# Patient Record
Sex: MALE | Race: WHITE | NOT HISPANIC OR LATINO | Employment: OTHER | URBAN - METROPOLITAN AREA
[De-identification: names, ages, dates, MRNs, and addresses within clinical notes are randomized per-mention and may not be internally consistent; named-entity substitution may affect disease eponyms.]

---

## 2024-07-17 ENCOUNTER — ANESTHESIA EVENT (OUTPATIENT)
Dept: SURGERY | Facility: AMBULATORY SURGERY CENTER | Age: 69
End: 2024-07-17

## 2024-07-18 ENCOUNTER — HOSPITAL ENCOUNTER (OUTPATIENT)
Facility: AMBULATORY SURGERY CENTER | Age: 69
Discharge: HOME | End: 2024-07-18
Attending: COLON & RECTAL SURGERY

## 2024-07-18 ENCOUNTER — ANESTHESIA (OUTPATIENT)
Dept: SURGERY | Facility: AMBULATORY SURGERY CENTER | Age: 69
End: 2024-07-18

## 2024-07-18 VITALS
OXYGEN SATURATION: 96 % | DIASTOLIC BLOOD PRESSURE: 61 MMHG | HEART RATE: 67 BPM | BODY MASS INDEX: 24.64 KG/M2 | RESPIRATION RATE: 16 BRPM | WEIGHT: 176 LBS | TEMPERATURE: 96.8 F | SYSTOLIC BLOOD PRESSURE: 118 MMHG | HEIGHT: 71 IN

## 2024-07-18 DIAGNOSIS — K62.9 ANAL LESION: ICD-10-CM

## 2024-07-18 RX ORDER — NALOXONE HYDROCHLORIDE 0.4 MG/ML
0.1 INJECTION, SOLUTION INTRAMUSCULAR; INTRAVENOUS; SUBCUTANEOUS
Status: DISCONTINUED | OUTPATIENT
Start: 2024-07-18 | End: 2024-07-19 | Stop reason: HOSPADM

## 2024-07-18 RX ORDER — BUPIVACAINE HYDROCHLORIDE 2.5 MG/ML
INJECTION, SOLUTION INFILTRATION; PERINEURAL PRN
Status: DISCONTINUED | OUTPATIENT
Start: 2024-07-18 | End: 2024-07-18 | Stop reason: HOSPADM

## 2024-07-18 RX ORDER — DEXAMETHASONE SODIUM PHOSPHATE 4 MG/ML
INJECTION, SOLUTION INTRA-ARTICULAR; INTRALESIONAL; INTRAMUSCULAR; INTRAVENOUS; SOFT TISSUE PRN
Status: DISCONTINUED | OUTPATIENT
Start: 2024-07-18 | End: 2024-07-18

## 2024-07-18 RX ORDER — ACETAMINOPHEN 325 MG/1
650 TABLET ORAL
Status: DISCONTINUED | OUTPATIENT
Start: 2024-07-18 | End: 2024-07-19 | Stop reason: HOSPADM

## 2024-07-18 RX ORDER — PROPOFOL 10 MG/ML
INJECTION, EMULSION INTRAVENOUS PRN
Status: DISCONTINUED | OUTPATIENT
Start: 2024-07-18 | End: 2024-07-18

## 2024-07-18 RX ORDER — ONDANSETRON 2 MG/ML
INJECTION INTRAMUSCULAR; INTRAVENOUS PRN
Status: DISCONTINUED | OUTPATIENT
Start: 2024-07-18 | End: 2024-07-18

## 2024-07-18 RX ORDER — DEXAMETHASONE SODIUM PHOSPHATE 4 MG/ML
4 INJECTION, SOLUTION INTRA-ARTICULAR; INTRALESIONAL; INTRAMUSCULAR; INTRAVENOUS; SOFT TISSUE
Status: DISCONTINUED | OUTPATIENT
Start: 2024-07-18 | End: 2024-07-19 | Stop reason: HOSPADM

## 2024-07-18 RX ORDER — FENTANYL CITRATE 50 UG/ML
INJECTION, SOLUTION INTRAMUSCULAR; INTRAVENOUS PRN
Status: DISCONTINUED | OUTPATIENT
Start: 2024-07-18 | End: 2024-07-18

## 2024-07-18 RX ORDER — ACETAMINOPHEN 325 MG/1
650 TABLET ORAL EVERY 4 HOURS PRN
COMMUNITY
Start: 2024-07-18 | End: 2024-08-01

## 2024-07-18 RX ORDER — IBUPROFEN 800 MG/1
800 TABLET, FILM COATED ORAL EVERY 8 HOURS PRN
COMMUNITY
Start: 2024-07-18 | End: 2024-08-01

## 2024-07-18 RX ORDER — ONDANSETRON 4 MG/1
4 TABLET, ORALLY DISINTEGRATING ORAL
Status: DISCONTINUED | OUTPATIENT
Start: 2024-07-18 | End: 2024-07-19 | Stop reason: HOSPADM

## 2024-07-18 RX ORDER — OXYCODONE HYDROCHLORIDE 5 MG/1
5 TABLET ORAL
Status: DISCONTINUED | OUTPATIENT
Start: 2024-07-18 | End: 2024-07-19 | Stop reason: HOSPADM

## 2024-07-18 RX ORDER — SODIUM CHLORIDE, SODIUM LACTATE, POTASSIUM CHLORIDE, CALCIUM CHLORIDE 600; 310; 30; 20 MG/100ML; MG/100ML; MG/100ML; MG/100ML
INJECTION, SOLUTION INTRAVENOUS CONTINUOUS
Status: DISCONTINUED | OUTPATIENT
Start: 2024-07-18 | End: 2024-07-19 | Stop reason: HOSPADM

## 2024-07-18 RX ORDER — LIDOCAINE HYDROCHLORIDE 20 MG/ML
INJECTION, SOLUTION INFILTRATION; PERINEURAL PRN
Status: DISCONTINUED | OUTPATIENT
Start: 2024-07-18 | End: 2024-07-18

## 2024-07-18 RX ORDER — POLYETHYLENE GLYCOL 3350 17 G/17G
1 POWDER, FOR SOLUTION ORAL DAILY PRN
COMMUNITY
Start: 2024-07-18

## 2024-07-18 RX ORDER — LIDOCAINE 40 MG/G
CREAM TOPICAL
Status: DISCONTINUED | OUTPATIENT
Start: 2024-07-18 | End: 2024-07-19 | Stop reason: HOSPADM

## 2024-07-18 RX ORDER — EPHEDRINE SULFATE 50 MG/ML
INJECTION, SOLUTION INTRAMUSCULAR; INTRAVENOUS; SUBCUTANEOUS PRN
Status: DISCONTINUED | OUTPATIENT
Start: 2024-07-18 | End: 2024-07-18

## 2024-07-18 RX ORDER — ONDANSETRON 2 MG/ML
4 INJECTION INTRAMUSCULAR; INTRAVENOUS EVERY 30 MIN PRN
Status: DISCONTINUED | OUTPATIENT
Start: 2024-07-18 | End: 2024-07-19 | Stop reason: HOSPADM

## 2024-07-18 RX ORDER — ONDANSETRON 4 MG/1
4 TABLET, ORALLY DISINTEGRATING ORAL EVERY 30 MIN PRN
Status: DISCONTINUED | OUTPATIENT
Start: 2024-07-18 | End: 2024-07-19 | Stop reason: HOSPADM

## 2024-07-18 RX ORDER — ACETAMINOPHEN 325 MG/1
975 TABLET ORAL ONCE
Status: COMPLETED | OUTPATIENT
Start: 2024-07-18 | End: 2024-07-18

## 2024-07-18 RX ORDER — OXYCODONE HYDROCHLORIDE 10 MG/1
10 TABLET ORAL
Status: DISCONTINUED | OUTPATIENT
Start: 2024-07-18 | End: 2024-07-19 | Stop reason: HOSPADM

## 2024-07-18 RX ADMIN — ACETAMINOPHEN 975 MG: 325 TABLET ORAL at 09:47

## 2024-07-18 RX ADMIN — EPHEDRINE SULFATE 10 MG: 50 INJECTION, SOLUTION INTRAMUSCULAR; INTRAVENOUS; SUBCUTANEOUS at 11:00

## 2024-07-18 RX ADMIN — LIDOCAINE HYDROCHLORIDE 5 ML: 20 INJECTION, SOLUTION INFILTRATION; PERINEURAL at 10:42

## 2024-07-18 RX ADMIN — EPHEDRINE SULFATE 5 MG: 50 INJECTION, SOLUTION INTRAMUSCULAR; INTRAVENOUS; SUBCUTANEOUS at 11:02

## 2024-07-18 RX ADMIN — FENTANYL CITRATE 50 MCG: 50 INJECTION, SOLUTION INTRAMUSCULAR; INTRAVENOUS at 10:52

## 2024-07-18 RX ADMIN — ONDANSETRON 4 MG: 2 INJECTION INTRAMUSCULAR; INTRAVENOUS at 10:42

## 2024-07-18 RX ADMIN — DEXAMETHASONE SODIUM PHOSPHATE 8 MG: 4 INJECTION, SOLUTION INTRA-ARTICULAR; INTRALESIONAL; INTRAMUSCULAR; INTRAVENOUS; SOFT TISSUE at 10:42

## 2024-07-18 RX ADMIN — PROPOFOL 150 MG: 10 INJECTION, EMULSION INTRAVENOUS at 10:42

## 2024-07-18 RX ADMIN — SODIUM CHLORIDE, SODIUM LACTATE, POTASSIUM CHLORIDE, CALCIUM CHLORIDE: 600; 310; 30; 20 INJECTION, SOLUTION INTRAVENOUS at 11:12

## 2024-07-18 RX ADMIN — SODIUM CHLORIDE, SODIUM LACTATE, POTASSIUM CHLORIDE, CALCIUM CHLORIDE: 600; 310; 30; 20 INJECTION, SOLUTION INTRAVENOUS at 11:18

## 2024-07-18 RX ADMIN — SODIUM CHLORIDE, SODIUM LACTATE, POTASSIUM CHLORIDE, CALCIUM CHLORIDE: 600; 310; 30; 20 INJECTION, SOLUTION INTRAVENOUS at 09:38

## 2024-07-18 RX ADMIN — Medication 50 MG: at 10:42

## 2024-07-18 RX ADMIN — FENTANYL CITRATE 50 MCG: 50 INJECTION, SOLUTION INTRAMUSCULAR; INTRAVENOUS at 10:42

## 2024-07-18 ASSESSMENT — COPD QUESTIONNAIRES: COPD: 0

## 2024-07-18 ASSESSMENT — LIFESTYLE VARIABLES: TOBACCO_USE: 1

## 2024-07-18 ASSESSMENT — ENCOUNTER SYMPTOMS
DYSRHYTHMIAS: 0
SEIZURES: 0

## 2024-07-18 NOTE — DISCHARGE INSTRUCTIONS
If you have any questions or concerns regarding your procedure, please contact Dr. Moya, his office number is [phone].     You have received 975 mg of Acetaminophen (Tylenol) at 0950. Please do not take an additional dose of Tylenol until after 3:50PM     Do not exceed 4,000 mg of acetaminophen during a 24 hour period and keep in mind that acetaminophen can also be found in many over-the-counter cold medications as well as narcotics that may be given for pain.      [facility]   Adult Discharge Orders & Instructions     For 24 hours after surgery    Get plenty of rest.  A responsible adult must stay with you for at least 24 hours after you leave the hospital.   Do not drive or use heavy equipment.  If you have weakness or tingling, don't drive or use heavy equipment until this feeling goes away.  Do not drink alcohol.  Avoid strenuous or risky activities.  Ask for help when climbing stairs.   You may feel lightheaded.  IF so, sit for a few minutes before standing.  Have someone help you get up.   If you have nausea (feel sick to your stomach): Drink only clear liquids such as apple juice, ginger ale, broth or 7-Up.  Rest may also help.  Be sure to drink enough fluids.  Move to a regular diet as you feel able.  You may have a slight fever. Call the doctor if your fever is over 100 F (37.7 C) (taken under the tongue) or lasts longer than 24 hours.  You may have a dry mouth, a sore throat, muscle aches or trouble sleeping.  These should go away after 24 hours.  Do not make important or legal decisions.   Call your doctor for any of the followin.  Signs of infection (fever, growing tenderness at the surgery site, a large amount of drainage or bleeding, severe pain, foul-smelling drainage, redness, swelling).    2. It has been over 8 to 10 hours since surgery and you are still not able to urinate (pass water).    3.  Headache for over 24 hours.     Patient Discharge Instructions:    You have  just undergone anorectal surgery.  Here are a few things to expect after your surgery:    1) It is common to have pain after surgery.  We try to focus on non-narcotic medications when possible, although sometimes we do give a narcotic prescription for pain that cannot be controlled with Tylenol and Ibuprofen. Take the medications as follows:  Tylenol: 650mg every 4 hours for 48 hours. Then every 4 hour as needed for pain. Do not take the Tylenol if you have been otherwise directed by a doctor not to take it (for liver problems, etc).  Ibuprofen: 800mg every 8 hours for 48 hours WITH FOOD. Then every 8 hours as needed for pain. Don't take NSAIDs if you have Crohn's, Ulcerative Colitis, kidney problems, or have been otherwise instructed not to take them.   You should offset these medications (e.g. Tylenol at 12:00PM, Ibuprofen at 2pm, Tylenol at 4:00PM, Tylenol at 8:00PM, Ibuprofen at 10:00PM).  If you have Oxycodone, you can take it at any time as long as you space it out every 4 hours.   You have received 975 mg of Acetaminophen (Tylenol) at 9:47am. Please do not take an additional dose of Tylenol until after 3:47pm.   Do not exceed 4,000 mg of acetaminophen during a 24 hour period and keep in mind that acetaminophen can also be found in many over-the-counter cold medications as well as narcotics that may be given for pain.     2) You may have some spotting or mild amounts of bleeding/bloody drainage.  If you see more significant bleeding, try holding some pressure over the wound for 15-20 minutes.   If you pass more than a cup full of blood or you cannot get the bleeding to stop by holding pressure, call the office.    3) Infections in this area are rare, but can be serious.  If you see small amounts of yellowish/pus like drainage in the days following surgery, this is expected. However, if you have increasing pain, increasing drainage, fevers, or an inability to urinate (can't pee), call the office or go the ER.      4) You will feel numb in the anorectal area for 4-6 hours after surgery due to the numbing medication used in the operating room.  It is possible you may experience some fecal incontinence (accidental passage of gas/stool) during this time.  The numbness will resolve on its own.  Once you feel sensation returning, you should consider taking something for pain as you can expect oral medications to take 30-60 minutes before you start feeling their effects.    5) There is generally no specific wound care necessary immediately after surgery (unless otherwise instructed by your surgeon).  Simply showering/bathing 1-2 times a day and after bowel movements is sufficient to keep the wounds clean.  You may want to keep a dry gauze/pad over the wound site as it is normal to have some amount of drainage, and this drainage can be irritating to the skin.  If you have a packing in the wound, your surgeon will give you more specific instructions on how to manage this.    6) For some operations you may have stitches in your wound.  Those stitches almost always break within a few days of surgery.  If you feel a pop or the wound opens - this is OK.  The wound will continue to fill in on its own.  It is still ok to shower/bathe as normal.  Expect some amount of drainage if the wound is open.    7) Avoiding constipation after surgery is very important. Start by taking Psyllium Husk (e.g. Metamucil or Konsyl) 1-2 tablespoons in a large glass of water daily. In addition to this, it is very important to drink at least 64 ounces of water daily. If you get constipated, it will be much more uncomfortable when you do have a bowel movement. If you do not have a BM in 2 days, try one of the following medications (over the counter) listed below:   Milk of Magnesia 30 mL every 8 hours until BM  Miralax 1-2 capfuls daily until BM (this may take 1-2 days)  Senna 1-2 tabs twice a day as necessary    8) Your only activity restrictions are no  driving while you are taking narcotics.  You may want to avoid heavy lifting/strenuous exercise for the first 1-2 days after surgery, but if you feel up to resuming normal activities/exercise, this is ok.    9) Lastly, if you have any questions or concerns - PLEASE CALL [phone])!  Our office has someone on call 24 hours a day.  If your question is one that can wait until normal business hours (Mon-Fri 8:30AM-5PM), it is better to wait until the daytime as someone more familiar with your care will be able to help you.  However, if it is an emergency, the on-call surgeon will be able to give you good advice.    Az Moya MD, MYLA  [facility]  Office: [phone]  7/18/2024 11:18 AM

## 2024-07-18 NOTE — ANESTHESIA POSTPROCEDURE EVALUATION
Patient: Kevin Juarez    Procedure: Procedure(s):  EXAM UNDER ANESTHESIA, WITH BIOPSY, Hemorrhoidectomy       Anesthesia Type:  MAC    Note:  Disposition: Outpatient   Postop Pain Control: Uneventful            Sign Out: Well controlled pain   PONV: No   Neuro/Psych: Uneventful            Sign Out: Acceptable/Baseline neuro status   Airway/Respiratory: Uneventful            Sign Out: Acceptable/Baseline resp. status   CV/Hemodynamics: Uneventful            Sign Out: Acceptable CV status; No obvious hypovolemia; No obvious fluid overload   Other NRE: NONE   DID A NON-ROUTINE EVENT OCCUR? No           Last vitals:  Vitals Value Taken Time   /58 07/18/24 1130   Temp 97.1  F (36.2  C) 07/18/24 1115   Pulse 70 07/18/24 1134   Resp 16 07/18/24 1112   SpO2 98 % 07/18/24 1134   Vitals shown include unfiled device data.    Electronically Signed By: Desiree Robbins MD  July 18, 2024  2:15 PM

## 2024-07-18 NOTE — OP NOTE
[facility] OPERATIVE NOTE    DATE OF SERVICE: 7/18/2024     PROCEDURE NAME:   Rectal exam under anesthesia  Hemorrhoidectomy (single column -combined internal/external hemorrhoid)     PRE-PROCEDURE DIAGNOSIS: Inflammatory polyp     POST-PROCEDURE DIAGNOSIS: Grade 3 internal/external hemorrhoid     SURGEON: Az Moya MD     ASSISTANT: None     FINDINGS: This prolapsed area was clearly a prolapsing internal hemorrhoid with a small associated external component.  This was excised in a standard hemorrhoidectomy.     ESTIMATED BLOOD LOSS: 1 mL     ANESTHESIA: General     SPECIMEN: Hemorrhoid    INDICATIONS FOR PROCEDURE:  Kevin Juarez is a 69 year old male presenting with a prolapsing polypoid mass from his anal canal.  This had been biopsied and returned as an inflammatory polyp.  The risks and benefits of surgery were discussed with the patient including infection, abscess recurrence, need for further operative interventions, possible damage to the sphincter and incontinence (or changes in continence) and increased pain and bleeding were discussed with the patient. Alternatives were also discussed. The patient agreed to proceed with surgery and informed consent was obtained.     DESCRIPTION OF PROCEDURE:  The patient was taken to the operating room and placed under general anesthesia. They were then placed in the prone sebas knife position on the operating room table. The buttocks were taped apart. The surgical area was then prepped and draped in the usual sterile fashion. A timeout was performed per protocol.  Examination of the perianal skin was significant for a prolapsing internal hemorrhoid in the right posterolateral position with a small external component.  This was clearly ulcerated and there was squamous metaplasia.  Next, a terminal pudendal nerve block was performed with 40mL of a 50/50 mix of 1% lidocaine with epinephrine and 0.25% bupivicaine. A digital rectal exam was performed which  revealed no other abnormalities within the anal canal.  Next a lubricated Sweet bivalve anoscope was placed into the anal canal.  Examination of the anal canal was significant for the large prolapsed internal hemorrhoid in the right posterolateral position with a small associated external component.    Based on these findings I proceeded to excise this as a hemorrhoidectomy.  The skin over the external hemorrhoid was elevated and the hemorrhoid was removed using electrocautery up to the apex.  The specimen was passed off the field.  Hemostasis was obtained.  The defect was closed using a running 3-0 chromic.     Hemostasis was confirmed. All sponge, needle and instrument counts were correct at the end of the procedure. The patient tolerated the procedure well and was awakened from anesthesia without difficulty, and transferred to the recovery room in stable condition.    COMPLICATIONS: None apparent    DISPOSITION: Stable to PACU    Az Moya MD, MYLA  [facility]  Office: [phone]  7/18/2024 11:25 AM

## 2024-07-18 NOTE — ANESTHESIA PROCEDURE NOTES
Airway       Patient location during procedure: OR       Procedure Start/Stop Times: 7/18/2024 10:46 AM  Staff -        Anesthesiologist:  Desiree Robbins MD       CRNA: Freida Jacob APRN CRNA       Performed By: anesthesiologist  Consent for Airway        Urgency: elective  Indications and Patient Condition       Indications for airway management: sai-procedural       Induction type:intravenous       Mask difficulty assessment: 2 - vent by mask + OA or adjuvant +/- NMBA    Final Airway Details       Final airway type: endotracheal airway       Successful airway: ETT - single  Endotracheal Airway Details        ETT size (mm): 7.5       Cuffed: yes       Successful intubation technique: direct laryngoscopy       DL Blade Type: Dang 2       Grade View of Cords: 2       Adjucts: stylet       Position: Right       Measured from: gums/teeth       Secured at (cm): 23       Bite block used: None    Post intubation assessment        Placement verified by: capnometry, equal breath sounds and chest rise        Number of attempts at approach: 2       Secured with: tape       Ease of procedure: easy       Dentition: Intact and Unchanged       Dental guard used and removed. Dental Guard Type: Proguard Red.    Medication(s) Administered   Medication Administration Time: 7/18/2024 10:46 AM    Additional Comments       Two attempts- CRNA first attempt and anesthesiologist second attempt. Grade 2B view.

## 2024-07-18 NOTE — H&P
Colorectal Surgery Staff:  I have seen and examined the patient. I agree with the above documentation and plan of the fellow/PA above with the following additions/chages:    69M w/ rectal polyp here for transanal excision under general anesthesia.     Az Moya MD MBA  [facility]  Office: [phone]  7/18/2024 10:23 AM

## 2024-07-18 NOTE — ANESTHESIA CARE TRANSFER NOTE
Patient: Kevin Juarez    Procedure: Procedure(s):  EXAM UNDER ANESTHESIA, WITH BIOPSY, Hemorrhoidectomy       Diagnosis: * No pre-op diagnosis entered *  Diagnosis Additional Information: No value filed.    Anesthesia Type:   MAC     Note:    Oropharynx: oropharynx clear of all foreign objects and spontaneously breathing  Level of Consciousness: awake and drowsy  Oxygen Supplementation: face mask  Level of Supplemental Oxygen (L/min / FiO2): 6  Independent Airway: airway patency satisfactory and stable  Dentition: dentition unchanged  Vital Signs Stable: post-procedure vital signs reviewed and stable  Report to RN Given: handoff report given  Patient transferred to: PACU    Handoff Report: Identifed the Patient, Identified the Reponsible Provider, Reviewed the pertinent medical history, Discussed the surgical course, Reviewed Intra-OP anesthesia mangement and issues during anesthesia, Set expectations for post-procedure period and Allowed opportunity for questions and acknowledgement of understanding      Vitals:  Vitals Value Taken Time   /57 07/18/24 1115   Temp 35.9  C (96.7  F) 07/18/24 1112   Pulse 75 07/18/24 1119   Resp 16 07/18/24 1112   SpO2 98 % 07/18/24 1119   Vitals shown include unfiled device data.    Electronically Signed By: SAMMI Morrison CRNA  July 18, 2024  11:22 AM

## 2024-07-18 NOTE — ANESTHESIA PREPROCEDURE EVALUATION
Anesthesia Pre-Procedure Evaluation    Patient: Kevin Juarez   MRN: [id number] : 1955        Procedure : Procedure(s):  EXAM UNDER ANESTHESIA, WITH BIOPSY          History reviewed. No pertinent past medical history.   Past Surgical History:   Procedure Laterality Date    COLONOSCOPY N/A 2024    COLONOSCOPY N/A 2019    COLONOSCOPY N/A 2014    COLONOSCOPY N/A 2011    Electrodesiccation with curettage  2018    Basal cell carcinoma, midline upper back    HERNIA REPAIR Bilateral 2005    Laparoscopic repair of Bilateral Inguinal Hernia      Allergies   Allergen Reactions    Betadine [Povidone Iodine] Other (See Comments)     Blisters    Zyban [Bupropion] Other (See Comments)     Mood Swings      Social History     Tobacco Use    Smoking status: Former     Current packs/day: 0.00     Types: Cigarettes     Quit date:      Years since quitting: 3.5    Smokeless tobacco: Current    Tobacco comments:     Vape   Substance Use Topics    Alcohol use: Yes     Comment: OCC      Wt Readings from Last 1 Encounters:   24 79.8 kg (176 lb)        Anesthesia Evaluation   Pt has had prior anesthetic.     No history of anesthetic complications       ROS/MED HX  ENT/Pulmonary:     (+)                tobacco use, Current use,                    (-) asthma, COPD, sleep apnea, RHIANNA risk factors and recent URI   Neurologic: Comment: Hearing loss    (-) no seizures and no CVA   Cardiovascular:    (-) hypertension, taking anticoagulants/antiplatelets, syncope and arrhythmias   METS/Exercise Tolerance: >4 METS    Hematologic:    (-) anemia   Musculoskeletal:       GI/Hepatic:    (-) GERD   Renal/Genitourinary:    (-) renal disease   Endo:    (-) Type I DM, Type II DM, thyroid disease and obesity   Psychiatric/Substance Use:    (-) chronic opioid use history   Infectious Disease:    (-) Recent Fever   Malignancy:       Other:            Physical Exam    Airway        Mallampati: II   TM  "distance: > 3 FB   Neck ROM: limited   Mouth opening: > 3 cm    Respiratory Devices and Support         Dental     Comment: Fair dentition, no loose or removable teeth        Cardiovascular          Rhythm and rate: regular     Pulmonary           breath sounds clear to auscultation           OUTSIDE LABS:  CBC: No results found for: \"WBC\", \"HGB\", \"HCT\", \"PLT\"  BMP: No results found for: \"NA\", \"POTASSIUM\", \"CHLORIDE\", \"CO2\", \"BUN\", \"CR\", \"GLC\"  COAGS: No results found for: \"PTT\", \"INR\", \"FIBR\"  POC: No results found for: \"BGM\", \"HCG\", \"HCGS\"  HEPATIC: No results found for: \"ALBUMIN\", \"PROTTOTAL\", \"ALT\", \"AST\", \"GGT\", \"ALKPHOS\", \"BILITOTAL\", \"BILIDIRECT\", \"LORETTA\"  OTHER: No results found for: \"PH\", \"LACT\", \"A1C\", \"MAGEN\", \"PHOS\", \"MAG\", \"LIPASE\", \"AMYLASE\", \"TSH\", \"T4\", \"T3\", \"CRP\", \"SED\"    Anesthesia Plan    ASA Status:  2    NPO Status:  NPO Appropriate    Anesthesia Type: MAC.              Consents    Anesthesia Plan(s) and associated risks, benefits, and realistic alternatives discussed. Questions answered and patient/representative(s) expressed understanding.     - Discussed:     - Discussed with:  Patient, Spouse      - Extended Intubation/Ventilatory Support Discussed: No.      - Patient is DNR/DNI Status: No     Use of blood products discussed: No .     Postoperative Care    Pain management: IV analgesics.   PONV prophylaxis: Ondansetron (or other 5HT-3)     Comments:               Desiree Robbins MD    I have reviewed the pertinent notes and labs in the chart from the past 30 days and (re)examined the patient.  Any updates or changes from those notes are reflected in this note.                  "

## 2024-10-06 ENCOUNTER — HEALTH MAINTENANCE LETTER (OUTPATIENT)
Age: 69
End: 2024-10-06